# Patient Record
Sex: FEMALE | Race: WHITE | NOT HISPANIC OR LATINO | URBAN - METROPOLITAN AREA
[De-identification: names, ages, dates, MRNs, and addresses within clinical notes are randomized per-mention and may not be internally consistent; named-entity substitution may affect disease eponyms.]

---

## 2018-11-16 ENCOUNTER — EMERGENCY (EMERGENCY)
Facility: HOSPITAL | Age: 23
LOS: 1 days | Discharge: HOME | End: 2018-11-16
Attending: EMERGENCY MEDICINE | Admitting: EMERGENCY MEDICINE
Payer: COMMERCIAL

## 2018-11-16 VITALS
RESPIRATION RATE: 18 BRPM | SYSTOLIC BLOOD PRESSURE: 116 MMHG | HEART RATE: 55 BPM | TEMPERATURE: 98 F | DIASTOLIC BLOOD PRESSURE: 72 MMHG | OXYGEN SATURATION: 100 %
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
## 2018-11-16 DIAGNOSIS — T74.21XA ADULT SEXUAL ABUSE, CONFIRMED, INITIAL ENCOUNTER: ICD-10-CM
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
## 2018-11-16 DIAGNOSIS — Z88.1 ALLERGY STATUS TO OTHER ANTIBIOTIC AGENTS STATUS: ICD-10-CM

## 2018-11-16 PROCEDURE — 0: CUSTOM

## 2018-11-16 PROCEDURE — 99284 EMERGENCY DEPT VISIT MOD MDM: CPT

## 2018-11-16 RX ORDER — ONDANSETRON 8 MG/1
8 TABLET, FILM COATED ORAL ONCE
Qty: 0 | Refills: 0 | Status: COMPLETED | OUTPATIENT
Start: 2018-11-16 | End: 2018-11-16

## 2018-11-16 RX ORDER — AZITHROMYCIN 500 MG/1
1000 TABLET, FILM COATED ORAL ONCE
Qty: 0 | Refills: 0 | Status: DISCONTINUED | OUTPATIENT
Start: 2018-11-16 | End: 2018-11-16

## 2018-11-16 RX ORDER — LEVONORGESTREL 1.5 MG/1
1.5 TABLET ORAL ONCE
Qty: 0 | Refills: 0 | Status: DISCONTINUED | OUTPATIENT
Start: 2018-11-16 | End: 2018-11-16

## 2018-11-16 RX ORDER — METRONIDAZOLE 500 MG
2000 TABLET ORAL ONCE
Qty: 0 | Refills: 0 | Status: COMPLETED | OUTPATIENT
Start: 2018-11-16 | End: 2018-11-16

## 2018-11-16 RX ORDER — METRONIDAZOLE 500 MG
2000 TABLET ORAL ONCE
Qty: 0 | Refills: 0 | Status: DISCONTINUED | OUTPATIENT
Start: 2018-11-16 | End: 2018-11-16

## 2018-11-16 RX ORDER — CEFTRIAXONE 500 MG/1
250 INJECTION, POWDER, FOR SOLUTION INTRAMUSCULAR; INTRAVENOUS ONCE
Qty: 0 | Refills: 0 | Status: DISCONTINUED | OUTPATIENT
Start: 2018-11-16 | End: 2018-11-16

## 2018-11-16 RX ORDER — LEVONORGESTREL 1.5 MG/1
1.5 TABLET ORAL ONCE
Qty: 0 | Refills: 0 | Status: COMPLETED | OUTPATIENT
Start: 2018-11-16 | End: 2018-11-16

## 2018-11-16 RX ORDER — ONDANSETRON 8 MG/1
1 TABLET, FILM COATED ORAL
Qty: 30 | Refills: 0 | OUTPATIENT
Start: 2018-11-16 | End: 2018-11-25

## 2018-11-16 RX ORDER — CEFTRIAXONE 500 MG/1
250 INJECTION, POWDER, FOR SOLUTION INTRAMUSCULAR; INTRAVENOUS ONCE
Qty: 0 | Refills: 0 | Status: COMPLETED | OUTPATIENT
Start: 2018-11-16 | End: 2018-11-16

## 2018-11-16 RX ORDER — AZITHROMYCIN 500 MG/1
1000 TABLET, FILM COATED ORAL ONCE
Qty: 0 | Refills: 0 | Status: COMPLETED | OUTPATIENT
Start: 2018-11-16 | End: 2018-11-16

## 2018-11-16 RX ADMIN — ONDANSETRON 8 MILLIGRAM(S): 8 TABLET, FILM COATED ORAL at 19:00

## 2018-11-16 RX ADMIN — Medication 2000 MILLIGRAM(S): at 19:00

## 2018-11-16 RX ADMIN — CEFTRIAXONE 250 MILLIGRAM(S): 500 INJECTION, POWDER, FOR SOLUTION INTRAMUSCULAR; INTRAVENOUS at 19:00

## 2018-11-16 RX ADMIN — LEVONORGESTREL 1.5 MILLIGRAM(S): 1.5 TABLET ORAL at 19:00

## 2018-11-16 RX ADMIN — AZITHROMYCIN 1000 MILLIGRAM(S): 500 TABLET, FILM COATED ORAL at 19:00
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
## 2018-11-16 NOTE — ED ADULT NURSE NOTE - NSIMPLEMENTINTERV_GEN_ALL_ED
Implemented All Universal Safety Interventions:  [place] to call system. Call bell, personal items and telephone within reach. Instruct patient to call for assistance. Room bathroom lighting operational. Non-slip footwear when patient is off stretcher. Physically safe environment: no spills, clutter or unnecessary equipment. Stretcher in lowest position, wheels locked, appropriate side rails in place.
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
## 2025-01-15 NOTE — ED PROVIDER NOTE - NS ED ATTENDING STATEMENT MOD
Jamee Triana (:  1964) is a 60 y.o. female,Established patient, here for evaluation of the following chief complaint(s):  1 Month Follow-Up (Needs refill on estrogen) and Back Pain      Assessment & Plan   ASSESSMENT/PLAN:  1. Bilateral hand numbness with positive tinel's sign bilaterally, consistent with CTS, will get EMG with neurology.  Showed patient which time of hand splints to start wearing that are over the counter.  Remove to wash or bath, wear continuously.   -     Lakshmi Gallo MD, Neurology, [facility]  2. Vaginal discharge will get urine for GC/ Chlamydia and refer to GYN for evaluation with history of KAJAL.   -     Preet Chris MD, Gynecology, [facility]  -     C.trachomatis N.gonorrhoeae DNA, Urine ([place] Only); Future  3. Bilateral leg numbness: same as number 2.  Maternal aunt with breast cancer.  -     Lakshmi Gallo MD, Neurology, [facility]    4. Encounter for screening mammogram for breast cancer with no breast pain or lumps noted by patient, will order screening mammogram.  -     Pulmonx BALTA DIGITAL SCREEN BILATERAL; Future  5. BMI 28.0-28.9,adult: Losing weight with [employer] telemedicine.   No abdominal pain with GLP 1.  Monitor labs.   Wt Readings from Last 3 Encounters:   25 76.7 kg (169 lb)   25 77.1 kg (170 lb)   01/15/25 78.3 kg (172 lb 9.6 oz)      -     CBC with Auto Differential; Future  -     Comprehensive Metabolic Panel; Future  -     Lipase; Future  7. Chronic pruritus was controlled on H1 and H2 blocker and symptoms started after ran out of H2 , blocker.  Not as severe as in past.   In past normal liver test and no eosinophilia on cbc, will repeat.  Reorder H2 blocker.  Patient did see allergist in .   -     famotidine (PEPCID) 20 MG tablet; Take 1 tablet by mouth 2 times daily, Disp-180 tablet, R-1Normal      Return in about 6 weeks (around 2025).         Subjective   SUBJECTIVE/OBJECTIVE:  Hand Pain 
Attending Only

## 2025-03-28 NOTE — ED PROVIDER NOTE - TOBACCO USE
Spoke with pt.    Pt informed of lab results/ PCP recommendations. Pt verbalized understanding/ agreement with plan of care. Pt provided with Gregory  educator number for scheduling. No further questions at this time.     Unknown if ever smoked